# Patient Record
Sex: FEMALE | Race: WHITE | NOT HISPANIC OR LATINO | Employment: PART TIME | URBAN - METROPOLITAN AREA
[De-identification: names, ages, dates, MRNs, and addresses within clinical notes are randomized per-mention and may not be internally consistent; named-entity substitution may affect disease eponyms.]

---

## 2022-07-27 ENCOUNTER — OFFICE VISIT (OUTPATIENT)
Dept: MEDICAL GROUP | Facility: IMAGING CENTER | Age: 22
End: 2022-07-27
Payer: COMMERCIAL

## 2022-07-27 VITALS
OXYGEN SATURATION: 98 % | TEMPERATURE: 98.1 F | WEIGHT: 136.4 LBS | SYSTOLIC BLOOD PRESSURE: 110 MMHG | HEIGHT: 60 IN | RESPIRATION RATE: 17 BRPM | HEART RATE: 103 BPM | BODY MASS INDEX: 26.78 KG/M2 | DIASTOLIC BLOOD PRESSURE: 62 MMHG

## 2022-07-27 DIAGNOSIS — Z76.89 ENCOUNTER TO ESTABLISH CARE: ICD-10-CM

## 2022-07-27 DIAGNOSIS — Z3A.01 LESS THAN 8 WEEKS GESTATION OF PREGNANCY: ICD-10-CM

## 2022-07-27 DIAGNOSIS — Z11.3 SCREENING EXAMINATION FOR SEXUALLY TRANSMITTED DISEASE: ICD-10-CM

## 2022-07-27 DIAGNOSIS — E03.8 OTHER SPECIFIED HYPOTHYROIDISM: ICD-10-CM

## 2022-07-27 DIAGNOSIS — G40.909 NONINTRACTABLE EPILEPSY WITHOUT STATUS EPILEPTICUS, UNSPECIFIED EPILEPSY TYPE (HCC): ICD-10-CM

## 2022-07-27 PROCEDURE — 99204 OFFICE O/P NEW MOD 45 MIN: CPT

## 2022-07-27 RX ORDER — LEVOTHYROXINE SODIUM 112 UG/1
112 TABLET ORAL
Qty: 30 TABLET | Refills: 0 | Status: SHIPPED | OUTPATIENT
Start: 2022-07-27 | End: 2022-08-25

## 2022-07-27 RX ORDER — LEVOTHYROXINE SODIUM 112 UG/1
112 TABLET ORAL
COMMUNITY
End: 2022-07-27 | Stop reason: SDUPTHER

## 2022-07-27 ASSESSMENT — PAIN SCALES - GENERAL: PAINLEVEL: NO PAIN

## 2022-07-27 NOTE — PROGRESS NOTES
Chief Complaint   Patient presents with   • Establish Care   • Requesting Labs     HISTORY OF THE PRESENT ILLNESS: Patient is a 22 y.o. female. This pleasant patient is here today to establish care and to discuss:    To establish care  Previous PCP from [place], [place]  Recently moved here 9 days ago    Hypothyroid  Established condition. Takes levothyroxine 112 mcg every am. Last TSH level 3 months ago, records not available.   Patient denies weakness, fatigue, lethargy, cold/heat intolerance, difficulty losing weight, confusion, depression, dry skin/hair, changes in menses, constipation.    Pregnant  Patient reports of taking pregnancy test a few days ago with positive results.  LMP 6/22/22. Patient reports that this is an expected pregnancy. She is engaged. Patient has been taking prenatal for the past 3-4 months. Patient has had one episode of nausea and vomiting. Her appetite is good. She is eating well-balanced meal. Patient requesting midwife.  No vaginal bleeding, abdominal pain, urinary symptoms, fevers, chills, lethargy, or malaise.    Epilepsy  Established issue. Patient was seeing neurologist in [place]. Patient has stopped taking Vimpat 2 months ago as she does not like how she feels while on medication. Patient states she has not had any episodes since.   Patient states stress triggers her symptoms. Patient is currently managing her stress level.     Allergies: Penicillins    Current Outpatient Medications Ordered in Epic   Medication Sig Dispense Refill   • levothyroxine (SYNTHROID) 112 MCG Tab Take 1 Tablet by mouth every morning on an empty stomach. 30 Tablet 0     No current Epic-ordered facility-administered medications on file.       Past Medical History:   Diagnosis Date   • Epilepsy (HCC)    • Other specified hypothyroidism        Past Surgical History:   Procedure Laterality Date   • MENISCUS REPAIR Right        Social History     Tobacco Use   • Smoking status: Never Smoker   • Smokeless tobacco:  Never Used   Vaping Use   • Vaping Use: Never used   Substance Use Topics   • Alcohol use: Never   • Drug use: Never       Social History     Social History Narrative   • Not on file       History reviewed. No pertinent family history.    ROS: per hpi    Gen: no fevers/chills  Pulm: no sob, no cough  CV: no chest pain, no palpitations, no edema  GI: no nausea/vomiting, no diarrhea  Skin: no rash    Exam: /62 (BP Location: Left arm, Patient Position: Sitting, BP Cuff Size: Adult)   Pulse (!) 103   Temp 36.7 °C (98.1 °F) (Temporal)   Resp 17   Ht 1.524 m (5')   Wt 61.9 kg (136 lb 6.4 oz)   SpO2 98%  Body mass index is 26.64 kg/m².    General: Normal appearing. No distress.  HEENT: Normocephalic. Eyes conjunctiva clear lids without ptosis, ears normal shape and contour,nasal mucosa benign, oropharynx is without erythema, edema or exudates.   Neck: Supple. Thyroid is not enlarged.  Pulmonary: Clear to ausculation.  Normal effort. No rales, ronchi, or wheezing.  Cardiovascular: Regular rate and rhythm without murmur. Carotid and radial pulses are intact and equal bilaterally.  Abdomen: Soft, nontender, nondistended. Normal bowel sounds.  Neurologic: Grossly nonfocal  Lymph: No cervical, supraclavicular lymph nodes are palpable  Skin: Warm and dry.  No obvious lesions.  Musculoskeletal: Normal gait. No extremity cyanosis, clubbing, or edema.  Psych: Normal mood and affect. Alert and oriented x3. Judgment and insight is normal.    Please note that this dictation was created using voice recognition software. I have made every reasonable attempt to correct obvious errors, but I expect that there are errors of grammar and possibly content that I did not discover before finalizing the note.      Assessment/Plan    22 y.o. female with the following -    1. Encounter to establish care    2. Other specified hypothyroidism  Established condition.  TSH level unavailable.  Will refill levothyroxine.    Instructed patient  to take medication first thing in the morning on an empty stomach with at least 8 oz of water, 30 minutes to 1 hour before breakfast. Do not take any other medications with this. Do not take with any other medications.  Will have patient check TSH level asap and make adjustments accordingly.   Advised to seek medical attention for symptoms such as heart palpitations, chest pain, hot flashes, diaphoresis, tremors, weakness, insomnia, anxiety, appetite changes, nausea, vomiting, or diarrhea.    - TSH WITH REFLEX TO FT4; Future  - levothyroxine (SYNTHROID) 112 MCG Tab; Take 1 Tablet by mouth every morning on an empty stomach.  Dispense: 30 Tablet; Refill: 0    3. Screening examination for sexually transmitted disease    - HIV AG/AB COMBO ASSAY SCREENING; Future  - Chlamydia/GC, PCR (Urine); Future  - T.PALLIDUM AB EIA; Future    4. Less than 8 weeks gestation of pregnancy  New finding. Expected pregnancy, no s/s of complication at this time. Will order labs to confirm HcG level. Will order routine lab for prenatal care to rule out conditions that could complicate pregnancy such as diabetes, preeclampsia, infection, STI. Patient requesting midwife for pregnancy care and delivery.  Patient provided with education information handout on pregnancy and recommended vaccination.   Recommend healthy lifestyle such as diet that is rich in vegetables, fruits, fiber-rich whole grains, lean meats, poultry and fish, low in saturated and trans fats, cholesterol, sodium, and added sugars (DASH and Mediterranean diet).   Regular exercise. Smoking cessation. Avoid alcohol consumption. Stress-reduction techniques such as meditation.   Practice good sleep hygiene.   ED symptoms discussed.    - CBC WITH DIFFERENTIAL; Future  - Comp Metabolic Panel; Future  - Lipid Profile; Future  - HEMOGLOBIN A1C; Future  - HCG QUANTITATIVE; Future  - Referral to OB/Gyn  - URINALYSIS,CULTURE IF INDICATED; Future  - HEPATITIS PANEL ACUTE(4 COMPONENTS);  Future    5. Nonintractable epilepsy without status epilepticus, unspecified epilepsy type (HCC)  Established condition and currently controlled without recent episodes. Patient not on any medication at this time. Patient making lifestyle changes to prevent episodes. Will place referral to neurology for evaluating and management during pregnancy.     - Referral to Neurology    Referral for genetic research was offered. Patient decline.    Medical Decision Making/Course:  In the course of preparing for this visit with review of the pertinent past medical history, recent and past clinic visits, current medications, and performing chart, immunization, medical history and medication reconciliation, and in the further course of obtaining the current history pertinent to the clinic visit today, performing an exam and evaluation, ordering and independently evaluating labs, tests, and/or procedures, prescribing any recommended new medications as noted above, providing any pertinent counseling and education and recommending further coordination of care. This was discussed with patient in a shared-decision making conversation, and they understand and agreed with plan of care.    Return if symptoms worsen or fail to improve.     Thank you, Rashmi JJ  [facility]    Please note that this dictation was created using voice recognition software. I have made every reasonable attempt to correct obvious errors, but I expect that there are errors of grammar and possibly content that I did not discover before finalizing the note.

## 2022-07-28 ENCOUNTER — HOSPITAL ENCOUNTER (OUTPATIENT)
Dept: LAB | Facility: MEDICAL CENTER | Age: 22
End: 2022-07-28
Payer: COMMERCIAL

## 2022-07-28 DIAGNOSIS — E03.8 OTHER SPECIFIED HYPOTHYROIDISM: ICD-10-CM

## 2022-07-28 DIAGNOSIS — Z3A.01 LESS THAN 8 WEEKS GESTATION OF PREGNANCY: ICD-10-CM

## 2022-07-28 DIAGNOSIS — Z11.3 SCREENING EXAMINATION FOR SEXUALLY TRANSMITTED DISEASE: ICD-10-CM

## 2022-07-28 LAB
ALBUMIN SERPL BCP-MCNC: 4.5 G/DL (ref 3.2–4.9)
ALBUMIN/GLOB SERPL: 1.9 G/DL
ALP SERPL-CCNC: 79 U/L (ref 30–99)
ALT SERPL-CCNC: 22 U/L (ref 2–50)
ANION GAP SERPL CALC-SCNC: 11 MMOL/L (ref 7–16)
APPEARANCE UR: CLEAR
AST SERPL-CCNC: 22 U/L (ref 12–45)
B-HCG SERPL-ACNC: 2090 MIU/ML (ref 0–5)
BASOPHILS # BLD AUTO: 0.2 % (ref 0–1.8)
BASOPHILS # BLD: 0.02 K/UL (ref 0–0.12)
BILIRUB SERPL-MCNC: 0.4 MG/DL (ref 0.1–1.5)
BILIRUB UR QL STRIP.AUTO: NEGATIVE
BUN SERPL-MCNC: 8 MG/DL (ref 8–22)
CALCIUM SERPL-MCNC: 9.1 MG/DL (ref 8.5–10.5)
CHLORIDE SERPL-SCNC: 101 MMOL/L (ref 96–112)
CHOLEST SERPL-MCNC: 203 MG/DL (ref 100–199)
CO2 SERPL-SCNC: 21 MMOL/L (ref 20–33)
COLOR UR: YELLOW
CREAT SERPL-MCNC: 0.67 MG/DL (ref 0.5–1.4)
EOSINOPHIL # BLD AUTO: 0.12 K/UL (ref 0–0.51)
EOSINOPHIL NFR BLD: 1.3 % (ref 0–6.9)
ERYTHROCYTE [DISTWIDTH] IN BLOOD BY AUTOMATED COUNT: 40.6 FL (ref 35.9–50)
EST. AVERAGE GLUCOSE BLD GHB EST-MCNC: 103 MG/DL
FASTING STATUS PATIENT QL REPORTED: NORMAL
GFR SERPLBLD CREATININE-BSD FMLA CKD-EPI: 125 ML/MIN/1.73 M 2
GLOBULIN SER CALC-MCNC: 2.4 G/DL (ref 1.9–3.5)
GLUCOSE SERPL-MCNC: 85 MG/DL (ref 65–99)
GLUCOSE UR STRIP.AUTO-MCNC: NEGATIVE MG/DL
HAV IGM SERPL QL IA: NORMAL
HBA1C MFR BLD: 5.2 % (ref 4–5.6)
HBV CORE IGM SER QL: NORMAL
HBV SURFACE AG SER QL: NORMAL
HCT VFR BLD AUTO: 42.2 % (ref 37–47)
HCV AB SER QL: NORMAL
HDLC SERPL-MCNC: 49 MG/DL
HGB BLD-MCNC: 14.4 G/DL (ref 12–16)
HIV 1+2 AB+HIV1 P24 AG SERPL QL IA: NORMAL
IMM GRANULOCYTES # BLD AUTO: 0.03 K/UL (ref 0–0.11)
IMM GRANULOCYTES NFR BLD AUTO: 0.3 % (ref 0–0.9)
KETONES UR STRIP.AUTO-MCNC: NEGATIVE MG/DL
LDLC SERPL CALC-MCNC: 140 MG/DL
LEUKOCYTE ESTERASE UR QL STRIP.AUTO: NEGATIVE
LYMPHOCYTES # BLD AUTO: 2.52 K/UL (ref 1–4.8)
LYMPHOCYTES NFR BLD: 26.8 % (ref 22–41)
MCH RBC QN AUTO: 29.5 PG (ref 27–33)
MCHC RBC AUTO-ENTMCNC: 34.1 G/DL (ref 33.6–35)
MCV RBC AUTO: 86.5 FL (ref 81.4–97.8)
MICRO URNS: NORMAL
MONOCYTES # BLD AUTO: 0.59 K/UL (ref 0–0.85)
MONOCYTES NFR BLD AUTO: 6.3 % (ref 0–13.4)
NEUTROPHILS # BLD AUTO: 6.14 K/UL (ref 2–7.15)
NEUTROPHILS NFR BLD: 65.1 % (ref 44–72)
NITRITE UR QL STRIP.AUTO: NEGATIVE
NRBC # BLD AUTO: 0 K/UL
NRBC BLD-RTO: 0 /100 WBC
PH UR STRIP.AUTO: 6.5 [PH] (ref 5–8)
PLATELET # BLD AUTO: 326 K/UL (ref 164–446)
PMV BLD AUTO: 10.5 FL (ref 9–12.9)
POTASSIUM SERPL-SCNC: 3.9 MMOL/L (ref 3.6–5.5)
PROT SERPL-MCNC: 6.9 G/DL (ref 6–8.2)
PROT UR QL STRIP: NEGATIVE MG/DL
RBC # BLD AUTO: 4.88 M/UL (ref 4.2–5.4)
RBC UR QL AUTO: NEGATIVE
SODIUM SERPL-SCNC: 133 MMOL/L (ref 135–145)
SP GR UR STRIP.AUTO: 1.01
T4 FREE SERPL-MCNC: 1.48 NG/DL (ref 0.93–1.7)
TRIGL SERPL-MCNC: 68 MG/DL (ref 0–149)
TSH SERPL DL<=0.005 MIU/L-ACNC: 0.3 UIU/ML (ref 0.38–5.33)
UROBILINOGEN UR STRIP.AUTO-MCNC: 0.2 MG/DL
WBC # BLD AUTO: 9.4 K/UL (ref 4.8–10.8)

## 2022-07-28 PROCEDURE — 84702 CHORIONIC GONADOTROPIN TEST: CPT

## 2022-07-28 PROCEDURE — 80061 LIPID PANEL: CPT

## 2022-07-28 PROCEDURE — 81003 URINALYSIS AUTO W/O SCOPE: CPT

## 2022-07-28 PROCEDURE — 85025 COMPLETE CBC W/AUTO DIFF WBC: CPT

## 2022-07-28 PROCEDURE — 84443 ASSAY THYROID STIM HORMONE: CPT

## 2022-07-28 PROCEDURE — 84439 ASSAY OF FREE THYROXINE: CPT

## 2022-07-28 PROCEDURE — 86780 TREPONEMA PALLIDUM: CPT

## 2022-07-28 PROCEDURE — 87491 CHLMYD TRACH DNA AMP PROBE: CPT

## 2022-07-28 PROCEDURE — 83036 HEMOGLOBIN GLYCOSYLATED A1C: CPT

## 2022-07-28 PROCEDURE — 87591 N.GONORRHOEAE DNA AMP PROB: CPT

## 2022-07-28 PROCEDURE — 80053 COMPREHEN METABOLIC PANEL: CPT

## 2022-07-28 PROCEDURE — 36415 COLL VENOUS BLD VENIPUNCTURE: CPT

## 2022-07-28 PROCEDURE — 87389 HIV-1 AG W/HIV-1&-2 AB AG IA: CPT

## 2022-07-28 PROCEDURE — 80074 ACUTE HEPATITIS PANEL: CPT

## 2022-07-29 LAB — T PALLIDUM AB SER QL IA: NORMAL

## 2022-07-30 LAB
C TRACH DNA SPEC QL NAA+PROBE: NEGATIVE
N GONORRHOEA DNA SPEC QL NAA+PROBE: NEGATIVE
SPECIMEN SOURCE: NORMAL

## 2022-08-08 ENCOUNTER — OFFICE VISIT (OUTPATIENT)
Dept: MEDICAL GROUP | Facility: IMAGING CENTER | Age: 22
End: 2022-08-08
Payer: COMMERCIAL

## 2022-08-08 ENCOUNTER — HOSPITAL ENCOUNTER (OUTPATIENT)
Dept: LAB | Facility: MEDICAL CENTER | Age: 22
End: 2022-08-08
Payer: COMMERCIAL

## 2022-08-08 ENCOUNTER — TELEPHONE (OUTPATIENT)
Dept: MEDICAL GROUP | Facility: IMAGING CENTER | Age: 22
End: 2022-08-08

## 2022-08-08 VITALS
WEIGHT: 136.6 LBS | HEIGHT: 60 IN | DIASTOLIC BLOOD PRESSURE: 68 MMHG | TEMPERATURE: 99.2 F | OXYGEN SATURATION: 97 % | BODY MASS INDEX: 26.82 KG/M2 | HEART RATE: 89 BPM | SYSTOLIC BLOOD PRESSURE: 114 MMHG

## 2022-08-08 DIAGNOSIS — E03.8 OTHER SPECIFIED HYPOTHYROIDISM: ICD-10-CM

## 2022-08-08 LAB
T4 FREE SERPL-MCNC: 1.34 NG/DL (ref 0.93–1.7)
TSH SERPL DL<=0.005 MIU/L-ACNC: 0.3 UIU/ML (ref 0.38–5.33)

## 2022-08-08 PROCEDURE — 84439 ASSAY OF FREE THYROXINE: CPT

## 2022-08-08 PROCEDURE — 36415 COLL VENOUS BLD VENIPUNCTURE: CPT

## 2022-08-08 PROCEDURE — 84443 ASSAY THYROID STIM HORMONE: CPT

## 2022-08-08 PROCEDURE — 99212 OFFICE O/P EST SF 10 MIN: CPT

## 2022-08-08 ASSESSMENT — FIBROSIS 4 INDEX: FIB4 SCORE: 0.35

## 2022-08-08 ASSESSMENT — PAIN SCALES - GENERAL: PAINLEVEL: NO PAIN

## 2022-08-08 ASSESSMENT — PATIENT HEALTH QUESTIONNAIRE - PHQ9: CLINICAL INTERPRETATION OF PHQ2 SCORE: 0

## 2022-08-08 NOTE — PROGRESS NOTES
Subjective:     CC:   Chief Complaint   Patient presents with   • Thyroid Follow-up     Requesting labs for thyroid        HPI:   Nasima presents today to discuss:    Patient requesting to re-draw her thyroid levels as she is leaving for [facility] and will not be back for a couple of weeks. Patient is currently taking 112 mcg daily. Patient reports of having symptoms of increasing fatigue, weight gain of 4 lbs within the past 6 weeks, and increasing appetite. Patient is <8 weeks gestation of pregnancy. Patient denies nausea, vomiting, abdominal pain, vaginal bleeding or discharge. Patient has not established with OB/GYN.      Latest Reference Range & Units 07/28/22 09:29   TSH 0.380 - 5.330 uIU/mL 0.300 (L)   Free T-4 0.93 - 1.70 ng/dL 1.48     Pregnant Females, 1st Trimester  0.050-3.700   Pregnant Females, 2nd Trimester  0.310-4.350   Pregnant Females, 3rd Trimester  0.410-5.180       Past Medical History:   Diagnosis Date   • Epilepsy (HCC)    • Other specified hypothyroidism      No family history on file.  Past Surgical History:   Procedure Laterality Date   • MENISCUS REPAIR Right      Social History     Tobacco Use   • Smoking status: Never Smoker   • Smokeless tobacco: Never Used   Vaping Use   • Vaping Use: Never used   Substance Use Topics   • Alcohol use: Never   • Drug use: Never     Social History     Social History Narrative   • Not on file     Current Outpatient Medications Ordered in Epic   Medication Sig Dispense Refill   • levothyroxine (SYNTHROID) 112 MCG Tab Take 1 Tablet by mouth every morning on an empty stomach. 30 Tablet 0     No current Epic-ordered facility-administered medications on file.     Flu virus vaccine and Penicillins    ROS: see hpi  Gen: no fevers/chills  Pulm: no sob, no cough  CV: no chest pain, no palpitations, no edema  GI: no nausea/vomiting, no diarrhea  Skin: no rash    Objective:   Exam:  /68 (BP Location: Left arm, Patient Position: Sitting, BP Cuff Size:  Small adult)   Pulse 89   Temp 37.3 °C (99.2 °F) (Temporal)   Ht 1.524 m (5')   Wt 62 kg (136 lb 9.6 oz)   SpO2 97%   BMI 26.68 kg/m²    Body mass index is 26.68 kg/m².    Gen: Alert and oriented, No apparent distress.  HEENT: Head atraumatic, normocephalic. Pupils equal and round.  Neck: Neck is supple without lymphadenopathy.   Lungs: Normal effort, CTA bilaterally, no wheezes, rhonchi, or rales  CV: Regular rate and rhythm. No murmurs, rubs, or gallops.  ABD: +BS. Non-tender, non-distended. No rebound, rigidity, or guarding.  Ext: No clubbing, cyanosis, edema.    Assessment & Plan:     22 y.o. female with the following -     1. Other specified hypothyroidism  Chronic condition. Patient's most recent TSH within therapeutic range of first trimester of pregnancy. Recommend patient to continue with current dose. Will repeat TSH in 4 to 6  weeks. Patient reassured and discussed in detail of her symptoms as normal process of pregnancy. Encouraged patient to get plenty of rest, increase fluid, healthy diet, regular exercise, stress reduction techniques, and good sleep hygiene. Strongly advised patient to establish with OB/GYN.     - TSH WITH REFLEX TO FT4; Future  Medical Decision Making/Course:  In the course of preparing for this visit with review of the pertinent past medical history, recent and past clinic visits, current medications, and performing chart, immunization, medical history and medication reconciliation, and in the further course of obtaining the current history pertinent to the clinic visit today, performing an exam and evaluation, ordering and independently evaluating labs, tests, and/or procedures, prescribing any recommended new medications as noted above, providing any pertinent counseling and education and recommending further coordination of care. This was discussed with patient in a shared-decision making conversation, and they understand and agreed with plan of care.      Return if  symptoms worsen or fail to improve.    DEX Hdz   [facility]    Please note that this dictation was created using voice recognition software. I have made every reasonable attempt to correct obvious errors, but I expect that there are errors of grammar and possibly content that I did not discover before finalizing the note.

## 2022-08-25 DIAGNOSIS — E03.8 OTHER SPECIFIED HYPOTHYROIDISM: ICD-10-CM

## 2022-08-25 RX ORDER — LEVOTHYROXINE SODIUM 112 UG/1
TABLET ORAL
Qty: 30 TABLET | Refills: 0 | Status: SHIPPED | OUTPATIENT
Start: 2022-08-25 | End: 2022-09-22

## 2022-08-25 NOTE — TELEPHONE ENCOUNTER
Received request via: Pharmacy    Was the patient seen in the last year in this department? Yes    Does the patient have an active prescription (recently filled or refills available) for medication(s) requested? No e

## 2022-09-07 ENCOUNTER — TELEPHONE (OUTPATIENT)
Dept: MEDICAL GROUP | Facility: IMAGING CENTER | Age: 22
End: 2022-09-07
Payer: OTHER GOVERNMENT

## 2022-09-07 NOTE — TELEPHONE ENCOUNTER
Caller Name: Nasima Nelson    Call Back Number: [phone] (home)       How would the patient prefer to be contacted with a response: Phone call OK to leave a detailed message    Pt called to request lab orders to check her thyroid.   Rashmi can we order her some lab work?

## 2022-09-22 DIAGNOSIS — E03.8 OTHER SPECIFIED HYPOTHYROIDISM: ICD-10-CM

## 2022-09-22 RX ORDER — LEVOTHYROXINE SODIUM 112 UG/1
TABLET ORAL
Qty: 30 TABLET | Refills: 0 | Status: SHIPPED | OUTPATIENT
Start: 2022-09-22 | End: 2022-10-19

## 2022-09-26 ENCOUNTER — APPOINTMENT (OUTPATIENT)
Dept: RADIOLOGY | Facility: MEDICAL CENTER | Age: 22
End: 2022-09-26
Attending: EMERGENCY MEDICINE
Payer: OTHER GOVERNMENT

## 2022-09-26 ENCOUNTER — HOSPITAL ENCOUNTER (EMERGENCY)
Facility: MEDICAL CENTER | Age: 22
End: 2022-09-26
Attending: EMERGENCY MEDICINE
Payer: OTHER GOVERNMENT

## 2022-09-26 VITALS
RESPIRATION RATE: 20 BRPM | BODY MASS INDEX: 27.35 KG/M2 | TEMPERATURE: 97.8 F | HEART RATE: 88 BPM | HEIGHT: 60 IN | WEIGHT: 139.33 LBS | DIASTOLIC BLOOD PRESSURE: 71 MMHG | OXYGEN SATURATION: 96 % | SYSTOLIC BLOOD PRESSURE: 134 MMHG

## 2022-09-26 DIAGNOSIS — Z3A.13 13 WEEKS GESTATION OF PREGNANCY: ICD-10-CM

## 2022-09-26 DIAGNOSIS — R56.9 SEIZURE (HCC): ICD-10-CM

## 2022-09-26 LAB
ALBUMIN SERPL BCP-MCNC: 4 G/DL (ref 3.2–4.9)
ALBUMIN/GLOB SERPL: 1.7 G/DL
ALP SERPL-CCNC: 67 U/L (ref 30–99)
ALT SERPL-CCNC: 14 U/L (ref 2–50)
ANION GAP SERPL CALC-SCNC: 10 MMOL/L (ref 7–16)
APPEARANCE UR: CLEAR
AST SERPL-CCNC: 18 U/L (ref 12–45)
B-HCG SERPL-ACNC: ABNORMAL MIU/ML (ref 0–5)
BASOPHILS # BLD AUTO: 0.2 % (ref 0–1.8)
BASOPHILS # BLD: 0.02 K/UL (ref 0–0.12)
BILIRUB SERPL-MCNC: 0.2 MG/DL (ref 0.1–1.5)
BILIRUB UR QL STRIP.AUTO: NEGATIVE
BUN SERPL-MCNC: 6 MG/DL (ref 8–22)
CALCIUM SERPL-MCNC: 8.8 MG/DL (ref 8.5–10.5)
CHLORIDE SERPL-SCNC: 105 MMOL/L (ref 96–112)
CO2 SERPL-SCNC: 22 MMOL/L (ref 20–33)
COLOR UR: YELLOW
CREAT SERPL-MCNC: 0.56 MG/DL (ref 0.5–1.4)
EOSINOPHIL # BLD AUTO: 0.17 K/UL (ref 0–0.51)
EOSINOPHIL NFR BLD: 1.8 % (ref 0–6.9)
ERYTHROCYTE [DISTWIDTH] IN BLOOD BY AUTOMATED COUNT: 40.7 FL (ref 35.9–50)
GFR SERPLBLD CREATININE-BSD FMLA CKD-EPI: 130 ML/MIN/1.73 M 2
GLOBULIN SER CALC-MCNC: 2.4 G/DL (ref 1.9–3.5)
GLUCOSE SERPL-MCNC: 91 MG/DL (ref 65–99)
GLUCOSE UR STRIP.AUTO-MCNC: NEGATIVE MG/DL
HCT VFR BLD AUTO: 41.5 % (ref 37–47)
HGB BLD-MCNC: 14.3 G/DL (ref 12–16)
IMM GRANULOCYTES # BLD AUTO: 0.03 K/UL (ref 0–0.11)
IMM GRANULOCYTES NFR BLD AUTO: 0.3 % (ref 0–0.9)
KETONES UR STRIP.AUTO-MCNC: NEGATIVE MG/DL
LEUKOCYTE ESTERASE UR QL STRIP.AUTO: NEGATIVE
LIPASE SERPL-CCNC: 33 U/L (ref 11–82)
LYMPHOCYTES # BLD AUTO: 3.61 K/UL (ref 1–4.8)
LYMPHOCYTES NFR BLD: 38.2 % (ref 22–41)
MCH RBC QN AUTO: 29.9 PG (ref 27–33)
MCHC RBC AUTO-ENTMCNC: 34.5 G/DL (ref 33.6–35)
MCV RBC AUTO: 86.8 FL (ref 81.4–97.8)
MICRO URNS: NORMAL
MONOCYTES # BLD AUTO: 0.67 K/UL (ref 0–0.85)
MONOCYTES NFR BLD AUTO: 7.1 % (ref 0–13.4)
NEUTROPHILS # BLD AUTO: 4.95 K/UL (ref 2–7.15)
NEUTROPHILS NFR BLD: 52.4 % (ref 44–72)
NITRITE UR QL STRIP.AUTO: NEGATIVE
NRBC # BLD AUTO: 0 K/UL
NRBC BLD-RTO: 0 /100 WBC
NUMBER OF RH DOSES IND 8505RD: NORMAL
PH UR STRIP.AUTO: 7 [PH] (ref 5–8)
PLATELET # BLD AUTO: 243 K/UL (ref 164–446)
PMV BLD AUTO: 10.6 FL (ref 9–12.9)
POTASSIUM SERPL-SCNC: 3.8 MMOL/L (ref 3.6–5.5)
PROT SERPL-MCNC: 6.4 G/DL (ref 6–8.2)
PROT UR QL STRIP: NEGATIVE MG/DL
RBC # BLD AUTO: 4.78 M/UL (ref 4.2–5.4)
RBC UR QL AUTO: NEGATIVE
RH BLD: NORMAL
SODIUM SERPL-SCNC: 137 MMOL/L (ref 135–145)
SP GR UR STRIP.AUTO: 1.01
UROBILINOGEN UR STRIP.AUTO-MCNC: 0.2 MG/DL
WBC # BLD AUTO: 9.5 K/UL (ref 4.8–10.8)

## 2022-09-26 PROCEDURE — 86901 BLOOD TYPING SEROLOGIC RH(D): CPT

## 2022-09-26 PROCEDURE — 84702 CHORIONIC GONADOTROPIN TEST: CPT

## 2022-09-26 PROCEDURE — 76815 OB US LIMITED FETUS(S): CPT

## 2022-09-26 PROCEDURE — 85025 COMPLETE CBC W/AUTO DIFF WBC: CPT

## 2022-09-26 PROCEDURE — 83690 ASSAY OF LIPASE: CPT

## 2022-09-26 PROCEDURE — 99284 EMERGENCY DEPT VISIT MOD MDM: CPT

## 2022-09-26 PROCEDURE — 80053 COMPREHEN METABOLIC PANEL: CPT

## 2022-09-26 PROCEDURE — 81003 URINALYSIS AUTO W/O SCOPE: CPT

## 2022-09-26 PROCEDURE — 36415 COLL VENOUS BLD VENIPUNCTURE: CPT

## 2022-09-26 RX ORDER — LEVETIRACETAM 500 MG/1
500 TABLET ORAL 2 TIMES DAILY
Qty: 120 EACH | Refills: 0 | Status: SHIPPED | OUTPATIENT
Start: 2022-09-26

## 2022-09-26 RX ORDER — FERROUS SULFATE 325(65) MG
325 TABLET ORAL DAILY
COMMUNITY

## 2022-09-26 ASSESSMENT — FIBROSIS 4 INDEX: FIB4 SCORE: 0.35

## 2022-09-26 NOTE — ED TRIAGE NOTES
Chief Complaint   Patient presents with    Abdominal Pain     Started around 2300 tonight, to the bilateral lower abdomen, dull pain, denies vaginal bleeding     Pregnancy     13-14 weeks per patient, has not seen an OB/Gyn yet    Seizure     Has not been taking seizure medication d/t pregnancy.  States has had two tonight, witnessed by significant other, did not hit her head.       Pt ambulated to triage. Pt A&Ox4, came in for above complaints.     Pt to lobby . Pt educated on alerting staff in changes to condition. Pt verbalized understanding. Protocol ordered.     /81   Pulse 89   Temp 36.6 °C (97.8 °F) (Temporal)   Resp 18   Ht 1.524 m (5')   Wt 63.2 kg (139 lb 5.3 oz)   SpO2 99%   BMI 27.21 kg/m²

## 2022-09-26 NOTE — ED PROVIDER NOTES
ED Provider Note    CHIEF COMPLAINT  Chief Complaint   Patient presents with    Abdominal Pain     Started around 2300 tonight, to the bilateral lower abdomen, dull pain, denies vaginal bleeding     Pregnancy     13-14 weeks per patient, has not seen an OB/Gyn yet    Seizure     Has not been taking seizure medication d/t pregnancy.  States has had two tonight, witnessed by significant other, did not hit her head.       HPI  Nasima Nelson is a 22 y.o. female who presents after having a seizure.  Patient has a history of seizure disorder.  She has been off of her seizure medications for about the last 5 months.  She was previously on Vimpat but wanted to get pregnant and so after discussion [place] neurologist [place] it was elected to hold medications and see how things go.  She moved to [place].  Does not have a local neurologist.  She got pregnant and is about 13 to 14 weeks by her report.  G1, P0 LMP 6/22.  Has not seen OB/GYN yet.  Everything has been going okay.  She did have a short-lived seizure about a month ago.  Tonight she had another seizure at about 10 PM.  This was witnessed by her new .  It lasted about 45 seconds.  Generalized tonic-clonic with a postictal period.  Denies any injury.  After the seizure she had abdominal pain.  She localizes this in the suprapubic region.  Neither worse on the right or the left.  Dull aching pain.  No modifying factors and constant.  She has not had any bleeding.  No dysuria.  Denies fevers chills.  Denies chest pain shortness of breath or headache.    REVIEW OF SYSTEMS  As per HPI, otherwise a 10 point review of systems is negative    PAST MEDICAL HISTORY  Past Medical History:   Diagnosis Date    Epilepsy (HCC)     Other specified hypothyroidism        SOCIAL HISTORY  Social History     Tobacco Use    Smoking status: Never    Smokeless tobacco: Never   Vaping Use    Vaping Use: Never used   Substance Use Topics    Alcohol use: Never    Drug use:  Never       SURGICAL HISTORY  Past Surgical History:   Procedure Laterality Date    MENISCUS REPAIR Right        CURRENT MEDICATIONS  Home Medications       Reviewed by Natalie Eagle R.N. (Registered Nurse) on 09/26/22 at 0429  Med List Status: Partial     Medication Last Dose Status   ferrous sulfate 325 (65 Fe) MG tablet  Active   levothyroxine (SYNTHROID) 112 MCG Tab  Active   Prenatal MV-Min-Fe Fum-FA-DHA (PRENATAL 1 PO)  Active                    ALLERGIES  Allergies   Allergen Reactions    Flu Virus Vaccine Hives    Penicillins        PHYSICAL EXAM  VITAL SIGNS: /81   Pulse 89   Temp 36.6 °C (97.8 °F) (Temporal)   Resp 18   Ht 1.524 m (5')   Wt 63.2 kg (139 lb 5.3 oz)   SpO2 99%   BMI 27.21 kg/m²    Constitutional: Awake and alert  HENT: Normal inspection  Eyes: Normal inspection  Neck: Grossly normal range of motion.  Cardiovascular: Normal heart rate, Normal rhythm.  Symmetric peripheral pulses.   Thorax & Lungs: No respiratory distress, No wheezing, No rales, No rhonchi, No chest tenderness.   Abdomen: Bowel sounds normal, soft, non-distended, nontender, no mass  Skin: No obvious rash.  Back: No tenderness, No CVA tenderness.   Extremities: No clubbing, cyanosis, edema, no Homans or cords.  Neurologic: Grossly normal   Psychiatric: Normal for situation    RADIOLOGY/PROCEDURES  US-OB LIMITED TRANSABDOMINAL   Final Result         1.  Single intrauterine pregnancy of an estimated gestational age of 13 weeks 4 days with an estimated date of delivery of March 30, 2023.   2.  Small subchorionic hemorrhage           Imaging is interpreted by radiologist    Labs:  Results for orders placed or performed during the hospital encounter of 09/26/22   CBC WITH DIFFERENTIAL   Result Value Ref Range    WBC 9.5 4.8 - 10.8 K/uL    RBC 4.78 4.20 - 5.40 M/uL    Hemoglobin 14.3 12.0 - 16.0 g/dL    Hematocrit 41.5 37.0 - 47.0 %    MCV 86.8 81.4 - 97.8 fL    MCH 29.9 27.0 - 33.0 pg    MCHC 34.5 33.6 - 35.0 g/dL     RDW 40.7 35.9 - 50.0 fL    Platelet Count 243 164 - 446 K/uL    MPV 10.6 9.0 - 12.9 fL    Neutrophils-Polys 52.40 44.00 - 72.00 %    Lymphocytes 38.20 22.00 - 41.00 %    Monocytes 7.10 0.00 - 13.40 %    Eosinophils 1.80 0.00 - 6.90 %    Basophils 0.20 0.00 - 1.80 %    Immature Granulocytes 0.30 0.00 - 0.90 %    Nucleated RBC 0.00 /100 WBC    Neutrophils (Absolute) 4.95 2.00 - 7.15 K/uL    Lymphs (Absolute) 3.61 1.00 - 4.80 K/uL    Monos (Absolute) 0.67 0.00 - 0.85 K/uL    Eos (Absolute) 0.17 0.00 - 0.51 K/uL    Baso (Absolute) 0.02 0.00 - 0.12 K/uL    Immature Granulocytes (abs) 0.03 0.00 - 0.11 K/uL    NRBC (Absolute) 0.00 K/uL   COMP METABOLIC PANEL   Result Value Ref Range    Sodium 137 135 - 145 mmol/L    Potassium 3.8 3.6 - 5.5 mmol/L    Chloride 105 96 - 112 mmol/L    Co2 22 20 - 33 mmol/L    Anion Gap 10.0 7.0 - 16.0    Glucose 91 65 - 99 mg/dL    Bun 6 (L) 8 - 22 mg/dL    Creatinine 0.56 0.50 - 1.40 mg/dL    Calcium 8.8 8.5 - 10.5 mg/dL    AST(SGOT) 18 12 - 45 U/L    ALT(SGPT) 14 2 - 50 U/L    Alkaline Phosphatase 67 30 - 99 U/L    Total Bilirubin 0.2 0.1 - 1.5 mg/dL    Albumin 4.0 3.2 - 4.9 g/dL    Total Protein 6.4 6.0 - 8.2 g/dL    Globulin 2.4 1.9 - 3.5 g/dL    A-G Ratio 1.7 g/dL   LIPASE   Result Value Ref Range    Lipase 33 11 - 82 U/L   HCG QUANTITATIVE   Result Value Ref Range    Bhcg 75110.0 (H) 0.0 - 5.0 mIU/mL   URINALYSIS,CULTURE IF INDICATED    Specimen: Urine, Clean Catch; Blood   Result Value Ref Range    Color Yellow     Character Clear     Specific Gravity 1.010 <1.035    Ph 7.0 5.0 - 8.0    Glucose Negative Negative mg/dL    Ketones Negative Negative mg/dL    Protein Negative Negative mg/dL    Bilirubin Negative Negative    Urobilinogen, Urine 0.2 Negative    Nitrite Negative Negative    Leukocyte Esterase Negative Negative    Occult Blood Negative Negative    Micro Urine Req see below    ESTIMATED GFR   Result Value Ref Range    GFR (CKD-EPI) 130 >60 mL/min/1.73 m 2   RH TYPE FOR RHOGAM  FROM E.D.   Result Value Ref Range    Emergency Department Rh Typing POS     Number Of Rh Doses Indicated ZERO          COURSE & MEDICAL DECISION MAKING  Patient presents with seizure during second trimester pregnancy.  She is not postictal has no neuro findings.  This is an ongoing problem for her.  She is no longer on antiepileptics and this is likely cause.  She did complain of abdominal pain.  Obtain work-up.  Laboratory data as well as ultrasound were unremarkable.  She does have a small subchorionic hemorrhage that does not appear to present any consequence.  She is Rh+ and has not had any vaginal bleeding.  I spoke with Dr. Way who recommended Keppra.  Right before I went in to tell the patient about the results of her studies she had a short duration generalized tonic-clonic seizure and was postictal.  Patient's  says that the patient had an adverse reaction to Keppra he believes it was mood changes.  Requested that we wait to give any antiepileptics until she can provide history.  Patient was observed in the emergency department.    Admit to ED observation at 6:30 AM 9/26/2022  No known family history    Patient observed in the ED.  No further seizure.  Mental status normalized.  I spoke with the patient about advice from neurologist.  Patient says she does not want IV infusion of Keppra.  She states that she became very anxious or depressed when she was on Keppra.  After some discussion she did state that she would take pills but wanted to start them at home did not want to take anything while in the ED.  She stated she has had conversations with her neurologist in [place] about treatment.  I offered to call her neurologist but she declined and stated she was ready to go home.  She is awake alert oriented.  Appears to be able to make medical decisions.  I was very clear that not taking and after epileptic while she is in the ER is not adequate and she certainly could have another seizure which  could result in injury to herself or her fetus.  She voiced understanding to this but still requested to leave and stated she would take p.o. Keppra at home after she get some sleep.  Her  was witness to this conversation.  I have placed a referral in [facility] to neurology as well as [facility].  I would like her to be seen promptly with neurology and undergo routine prenatal care.  She should return to the ER for recurrent seizure or any concerning symptoms.    FINAL IMPRESSION  1.  Seizure  2.  13-week pregnancy without evidence of complication      This dictation was created using voice recognition software. The accuracy of the dictation is limited to the abilities of the software.  The nursing notes were reviewed and certain aspects of this information were incorporated into this note.      Electronically signed by: Foreign Jackson M.D., 9/26/2022 5:12 AM

## 2022-09-27 ENCOUNTER — TELEPHONE (OUTPATIENT)
Dept: OBGYN | Facility: CLINIC | Age: 22
End: 2022-09-27
Payer: OTHER GOVERNMENT

## 2022-09-27 NOTE — TELEPHONE ENCOUNTER
Called pt to schedule follow up from visit from the ER, pt stated that she is already scheduled elsewhere and won't provide the name of the clinic. I insisted to ask to where she is going because we are obligated to follow up w/ her for her next appt. Pt again denied to provide that information.

## 2022-10-19 DIAGNOSIS — E03.8 OTHER SPECIFIED HYPOTHYROIDISM: ICD-10-CM

## 2022-10-19 RX ORDER — LEVOTHYROXINE SODIUM 112 UG/1
TABLET ORAL
Qty: 30 TABLET | Refills: 0 | Status: SHIPPED | OUTPATIENT
Start: 2022-10-19

## 2022-11-21 DIAGNOSIS — O99.282 HYPOTHYROID IN PREGNANCY, ANTEPARTUM, SECOND TRIMESTER: ICD-10-CM

## 2022-11-21 DIAGNOSIS — E03.9 HYPOTHYROID IN PREGNANCY, ANTEPARTUM, SECOND TRIMESTER: ICD-10-CM

## 2022-11-21 DIAGNOSIS — O99.280 HYPOTHYROID IN PREGNANCY, ANTEPARTUM, UNSPECIFIED TRIMESTER: ICD-10-CM

## 2022-11-21 DIAGNOSIS — E03.9 HYPOTHYROID IN PREGNANCY, ANTEPARTUM, UNSPECIFIED TRIMESTER: ICD-10-CM

## 2023-01-19 ENCOUNTER — HOSPITAL ENCOUNTER (EMERGENCY)
Facility: MEDICAL CENTER | Age: 23
End: 2023-01-19
Attending: EMERGENCY MEDICINE
Payer: COMMERCIAL

## 2023-01-19 VITALS
TEMPERATURE: 97.9 F | DIASTOLIC BLOOD PRESSURE: 71 MMHG | SYSTOLIC BLOOD PRESSURE: 110 MMHG | RESPIRATION RATE: 18 BRPM | WEIGHT: 150 LBS | BODY MASS INDEX: 29.45 KG/M2 | OXYGEN SATURATION: 97 % | HEIGHT: 60 IN | HEART RATE: 86 BPM

## 2023-01-19 DIAGNOSIS — Z3A.31 31 WEEKS GESTATION OF PREGNANCY: ICD-10-CM

## 2023-01-19 DIAGNOSIS — R56.9 SEIZURE (HCC): ICD-10-CM

## 2023-01-19 LAB
APPEARANCE UR: CLEAR
BILIRUB UR QL STRIP.AUTO: NEGATIVE
COLOR UR: YELLOW
GLUCOSE UR STRIP.AUTO-MCNC: NEGATIVE MG/DL
KETONES UR STRIP.AUTO-MCNC: NEGATIVE MG/DL
LEUKOCYTE ESTERASE UR QL STRIP.AUTO: NEGATIVE
MICRO URNS: NORMAL
NITRITE UR QL STRIP.AUTO: NEGATIVE
PH UR STRIP.AUTO: 7 [PH] (ref 5–8)
PROT UR QL STRIP: NEGATIVE MG/DL
RBC UR QL AUTO: NEGATIVE
SP GR UR STRIP.AUTO: 1.01
UROBILINOGEN UR STRIP.AUTO-MCNC: 0.2 MG/DL

## 2023-01-19 PROCEDURE — 81003 URINALYSIS AUTO W/O SCOPE: CPT

## 2023-01-19 PROCEDURE — 99284 EMERGENCY DEPT VISIT MOD MDM: CPT

## 2023-01-19 ASSESSMENT — FIBROSIS 4 INDEX: FIB4 SCORE: 0.48

## 2023-01-19 NOTE — ED TRIAGE NOTES
Chief Complaint   Patient presents with    Seizure     Hx of epilespy. Per ems pt had x 2 seizure episodes that lasted 1-2 minutes each approximately an hour ago. Pt arrives alert and oriented x 4       Pt is 31 weeks pregnant. Pt states she was taken off vimpat by MD when she found out she was pregnant. Hx of recent bacterial vaginosis diagnosis as well.     /83   Pulse 99   Resp 20   Ht 1.524 m (5')   Wt 68 kg (150 lb)   SpO2 95%   BMI 29.29 kg/m²

## 2023-01-19 NOTE — PROGRESS NOTES
Pt is a ; MAIRA of 3/26; making her 30.4. PT was arrived via [facility] post ictal. Pt has a hx of epilepsy and is not currently taking any medications. Pt denies LOF, VB, UCs and reports not feeling much FM today. Pt was at OB office visit when seizure occurred but no reports of abdominal trauma. REactive NST obtained. ER MD aware of OB on call. Pt encouraged to have ER call if status changes.

## 2023-01-19 NOTE — ED PROVIDER NOTES
ED Provider Note    CHIEF COMPLAINT  Chief Complaint   Patient presents with    Seizure     Hx of epilespy. Per ems pt had x 2 seizure episodes that lasted 1-2 minutes each approximately an hour ago. Pt arrives alert and oriented x 4       EXTERNAL RECORDS REVIEWED  Outpatient Notes the patient was seen in the ER on September 26, 2022 with seizure, at that time she was 13 weeks pregnant, the patient at that time had a neurologist in [place] and they had decided that she would stop her Vimpat medication for seizure because she was pregnant.  At that time the patient did not want to take the Keppra and was discharged alert and oriented.    HPI/ROS  LIMITATION TO HISTORY   Select: : None  OUTSIDE HISTORIAN(S):      Nasima Nelson is a 22 y.o. female who presents with past medical history see med for epilepsy, historically she had been on the medication Vimpat but when she became pregnant she stopped taking the medication after discussion with her neurologist in [place].  The patient reports that she does have intermittent seizures throughout her pregnancy.  She reports that she is 31 weeks pregnant in her first pregnancy.  She is followed by Dr. Tonny Smith her OB and Dr. Burkett for neurology now in [place].  The patient had 2 seizures today about 1 hour ago that lasted 1 to 2 minutes each.  The patient now says she feels at her baseline.  She denies any neurological deficits.  She denies headache.  She denies any trauma.  The patient denies knowing of hypertension in pregnancy and denies leg swelling.    PAST MEDICAL HISTORY   has a past medical history of Epilepsy (HCC), Hypothyroidism, and Other specified hypothyroidism.    SURGICAL HISTORY   has a past surgical history that includes meniscus repair (Right).    FAMILY HISTORY  History reviewed. No pertinent family history.    SOCIAL HISTORY  Social History     Tobacco Use    Smoking status: Never    Smokeless tobacco: Never   Vaping Use    Vaping Use: Never used    Substance and Sexual Activity    Alcohol use: Never    Drug use: Never    Sexual activity: Yes     Partners: Male       CURRENT MEDICATIONS  Home Medications       Reviewed by Mitali Vick R.N. (Registered Nurse) on 01/19/23 at 1324  Med List Status: Not Addressed     Medication Last Dose Status   ferrous sulfate 325 (65 Fe) MG tablet  Active   levETIRAcetam (KEPPRA) 500 MG Tab  Active   levothyroxine (SYNTHROID) 112 MCG Tab  Active   Prenatal MV-Min-Fe Fum-FA-DHA (PRENATAL 1 PO)  Active                    ALLERGIES  Allergies   Allergen Reactions    Flagyl [Metronidazole]     Flu Virus Vaccine Hives    Keppra [Levetiracetam]     Lamictal [Lamotrigine]     Penicillins        PHYSICAL EXAM  VITAL SIGNS: /71   Pulse 86   Temp 37 °C (98.6 °F) (Temporal)   Resp 18   Ht 1.524 m (5')   Wt 68 kg (150 lb)   SpO2 97%   BMI 29.29 kg/m²    Constitutional: Alert.  HENT: No signs of trauma, Bilateral external ears normal, Nose normal.   Eyes: Pupils are equal and reactive, Conjunctiva normal, Non-icteric.   Neck: Normal range of motion, No tenderness, Supple, No stridor.   Lymphatic: No lymphadenopathy noted.   Cardiovascular: Regular rate and rhythm, no murmurs.   Thorax & Lungs: Normal breath sounds, No respiratory distress, No wheezing, No chest tenderness.   Abdomen: Gravid, no tenderness.  Skin: Warm, Dry, No erythema, No rash.   Back: No bony tenderness, No CVA tenderness.   Extremities: Intact distal pulses, No edema, No tenderness, No cyanosis.  Musculoskeletal: Good range of motion in all major joints. No tenderness to palpation or major deformities noted.   Neurologic: Alert , Normal motor function, Normal sensory function, No focal deficits noted.   Psychiatric: Affect normal, Judgment normal, Mood normal.       DIAGNOSTIC STUDIES / PROCEDURES    LABS  Labs Reviewed   URINALYSIS     Negative, no protein        COURSE & MEDICAL DECISION MAKING    ED Observation Status? No; Patient does not meet  criteria for ED Observation.     INITIAL ASSESSMENT AND PLAN  Care Narrative: The patient presents with seizure, she is hypertensive initially, differential diagnosis includes epilepsy, eclampsia.  UA was ordered to evaluate for protein in the urine.  On physical exam she does not have edema.  She possibly is hypertensive from her seizure.    L&D nurses were called, they are monitoring the baby as I enter the room.    ADDITIONAL PROBLEM LIST AND DISPOSITION        I have discussed management of the patient with the following physicians and GALLITO's: Dr. Smith the patient's obstetrician    Discussion of management with other \[facility] [place] [facility]\"" or appropriate source(s): Labor and delivery came down to monitor the patient's baby.    Escalation of care considered, and ultimately not performed:acute inpatient care management, however at this time, the patient is most appropriate for outpatient management    The patient presents with seizure and third trimester pregnancy.  She has no evidence of edema in her extremities.  UA was ordered to evaluate for protein.  Initially she is hypertensive.    Patient's urine is negative for protein, her blood pressure has improved to 110 systolic.  After speaking with Dr. Smith the patient will be discharged, he reported that the fetal monitoring was normal.  The patient will follow-up, we had further discussion about taking antiseizure medications, she will have this discussion again with her neurologist.  I discussed with the patient the signs of preeclampsia.    The patient will return for new or worsening symptoms and is stable at the time of discharge.    The patient is referred to a primary physician for blood pressure management, diabetic screening, and for all other preventative health concerns.      DISPOSITION:  Patient will be discharged home in stable condition.    FOLLOW UP:  [facility], Emergency Dept  [address] [place] [address] [phone]  Follow  up  If symptoms worsen, leg swelling, high blood pressure      OUTPATIENT MEDICATIONS:  New Prescriptions    No medications on file             FINAL DIAGNOSIS  1. Seizure (HCC)    2. 31 weeks gestation of pregnancy               Electronically signed by: Jeffery Shea M.D., 1/19/2023 1:50 PM

## 2023-03-10 ENCOUNTER — APPOINTMENT (OUTPATIENT)
Dept: NEUROLOGY | Facility: MEDICAL CENTER | Age: 23
End: 2023-03-10
Attending: STUDENT IN AN ORGANIZED HEALTH CARE EDUCATION/TRAINING PROGRAM
Payer: OTHER GOVERNMENT

## 2023-06-06 ENCOUNTER — APPOINTMENT (OUTPATIENT)
Dept: RADIOLOGY | Facility: MEDICAL CENTER | Age: 23
End: 2023-06-06
Attending: EMERGENCY MEDICINE
Payer: OTHER GOVERNMENT

## 2023-06-06 ENCOUNTER — HOSPITAL ENCOUNTER (EMERGENCY)
Facility: MEDICAL CENTER | Age: 23
End: 2023-06-06
Attending: EMERGENCY MEDICINE
Payer: OTHER GOVERNMENT

## 2023-06-06 VITALS
OXYGEN SATURATION: 98 % | BODY MASS INDEX: 29.29 KG/M2 | RESPIRATION RATE: 17 BRPM | DIASTOLIC BLOOD PRESSURE: 82 MMHG | WEIGHT: 150 LBS | SYSTOLIC BLOOD PRESSURE: 117 MMHG | TEMPERATURE: 98 F | HEART RATE: 120 BPM

## 2023-06-06 DIAGNOSIS — R56.9 SEIZURE (HCC): ICD-10-CM

## 2023-06-06 DIAGNOSIS — R51.9 ACUTE NONINTRACTABLE HEADACHE, UNSPECIFIED HEADACHE TYPE: ICD-10-CM

## 2023-06-06 LAB
ALBUMIN SERPL BCP-MCNC: 4.5 G/DL (ref 3.2–4.9)
ALBUMIN/GLOB SERPL: 2.1 G/DL
ALP SERPL-CCNC: 134 U/L (ref 30–99)
ALT SERPL-CCNC: 56 U/L (ref 2–50)
ANION GAP SERPL CALC-SCNC: 19 MMOL/L (ref 7–16)
AST SERPL-CCNC: 31 U/L (ref 12–45)
BASOPHILS # BLD AUTO: 0.3 % (ref 0–1.8)
BASOPHILS # BLD: 0.03 K/UL (ref 0–0.12)
BILIRUB SERPL-MCNC: 0.7 MG/DL (ref 0.1–1.5)
BUN SERPL-MCNC: 8 MG/DL (ref 8–22)
CALCIUM ALBUM COR SERPL-MCNC: 8.7 MG/DL (ref 8.5–10.5)
CALCIUM SERPL-MCNC: 9.1 MG/DL (ref 8.5–10.5)
CHLORIDE SERPL-SCNC: 102 MMOL/L (ref 96–112)
CO2 SERPL-SCNC: 16 MMOL/L (ref 20–33)
COHGB MFR BLD: 2.2 % (ref 0–4.9)
CREAT SERPL-MCNC: 0.72 MG/DL (ref 0.5–1.4)
EOSINOPHIL # BLD AUTO: 0.23 K/UL (ref 0–0.51)
EOSINOPHIL NFR BLD: 2.2 % (ref 0–6.9)
ERYTHROCYTE [DISTWIDTH] IN BLOOD BY AUTOMATED COUNT: 36.2 FL (ref 35.9–50)
GFR SERPLBLD CREATININE-BSD FMLA CKD-EPI: 119 ML/MIN/1.73 M 2
GLOBULIN SER CALC-MCNC: 2.1 G/DL (ref 1.9–3.5)
GLUCOSE SERPL-MCNC: 146 MG/DL (ref 65–99)
HCT VFR BLD AUTO: 45.8 % (ref 37–47)
HGB BLD-MCNC: 15.6 G/DL (ref 12–16)
IMM GRANULOCYTES # BLD AUTO: 0.02 K/UL (ref 0–0.11)
IMM GRANULOCYTES NFR BLD AUTO: 0.2 % (ref 0–0.9)
LYMPHOCYTES # BLD AUTO: 3.51 K/UL (ref 1–4.8)
LYMPHOCYTES NFR BLD: 33.1 % (ref 22–41)
MCH RBC QN AUTO: 30.2 PG (ref 27–33)
MCHC RBC AUTO-ENTMCNC: 34.1 G/DL (ref 32.2–35.5)
MCV RBC AUTO: 88.6 FL (ref 81.4–97.8)
MONOCYTES # BLD AUTO: 0.51 K/UL (ref 0–0.85)
MONOCYTES NFR BLD AUTO: 4.8 % (ref 0–13.4)
NEUTROPHILS # BLD AUTO: 6.31 K/UL (ref 1.82–7.42)
NEUTROPHILS NFR BLD: 59.4 % (ref 44–72)
NRBC # BLD AUTO: 0 K/UL
NRBC BLD-RTO: 0 /100 WBC (ref 0–0.2)
PLATELET # BLD AUTO: 276 K/UL (ref 164–446)
PMV BLD AUTO: 10.5 FL (ref 9–12.9)
POTASSIUM SERPL-SCNC: 3.5 MMOL/L (ref 3.6–5.5)
PROT SERPL-MCNC: 6.6 G/DL (ref 6–8.2)
RBC # BLD AUTO: 5.17 M/UL (ref 4.2–5.4)
SODIUM SERPL-SCNC: 137 MMOL/L (ref 135–145)
WBC # BLD AUTO: 10.6 K/UL (ref 4.8–10.8)

## 2023-06-06 PROCEDURE — 700111 HCHG RX REV CODE 636 W/ 250 OVERRIDE (IP): Performed by: EMERGENCY MEDICINE

## 2023-06-06 PROCEDURE — 36415 COLL VENOUS BLD VENIPUNCTURE: CPT

## 2023-06-06 PROCEDURE — 85025 COMPLETE CBC W/AUTO DIFF WBC: CPT

## 2023-06-06 PROCEDURE — 80053 COMPREHEN METABOLIC PANEL: CPT

## 2023-06-06 PROCEDURE — 70450 CT HEAD/BRAIN W/O DYE: CPT

## 2023-06-06 PROCEDURE — 96375 TX/PRO/DX INJ NEW DRUG ADDON: CPT

## 2023-06-06 PROCEDURE — 700105 HCHG RX REV CODE 258: Performed by: EMERGENCY MEDICINE

## 2023-06-06 PROCEDURE — 96374 THER/PROPH/DIAG INJ IV PUSH: CPT

## 2023-06-06 PROCEDURE — 82375 ASSAY CARBOXYHB QUANT: CPT

## 2023-06-06 PROCEDURE — 99284 EMERGENCY DEPT VISIT MOD MDM: CPT

## 2023-06-06 RX ORDER — LORAZEPAM 2 MG/ML
1 INJECTION INTRAMUSCULAR ONCE
Status: DISCONTINUED | OUTPATIENT
Start: 2023-06-06 | End: 2023-06-06 | Stop reason: HOSPADM

## 2023-06-06 RX ORDER — PROCHLORPERAZINE EDISYLATE 5 MG/ML
10 INJECTION INTRAMUSCULAR; INTRAVENOUS ONCE
Status: COMPLETED | OUTPATIENT
Start: 2023-06-06 | End: 2023-06-06

## 2023-06-06 RX ORDER — KETOROLAC TROMETHAMINE 30 MG/ML
30 INJECTION, SOLUTION INTRAMUSCULAR; INTRAVENOUS ONCE
Status: COMPLETED | OUTPATIENT
Start: 2023-06-06 | End: 2023-06-06

## 2023-06-06 RX ORDER — LACOSAMIDE 10 MG/ML
200 INJECTION, SOLUTION INTRAVENOUS ONCE
Status: DISCONTINUED | OUTPATIENT
Start: 2023-06-06 | End: 2023-06-06 | Stop reason: HOSPADM

## 2023-06-06 RX ORDER — SODIUM CHLORIDE 9 MG/ML
1000 INJECTION, SOLUTION INTRAVENOUS ONCE
Status: COMPLETED | OUTPATIENT
Start: 2023-06-06 | End: 2023-06-06

## 2023-06-06 RX ADMIN — SODIUM CHLORIDE 1000 ML: 9 INJECTION, SOLUTION INTRAVENOUS at 12:32

## 2023-06-06 RX ADMIN — KETOROLAC TROMETHAMINE 30 MG: 30 INJECTION, SOLUTION INTRAMUSCULAR; INTRAVENOUS at 12:41

## 2023-06-06 RX ADMIN — PROCHLORPERAZINE EDISYLATE 10 MG: 5 INJECTION INTRAMUSCULAR; INTRAVENOUS at 12:22

## 2023-06-06 ASSESSMENT — FIBROSIS 4 INDEX: FIB4 SCORE: 0.49

## 2023-06-06 NOTE — ED PROVIDER NOTES
ED Provider Note    Primary care provider: DEX Hdz    CHIEF COMPLAINT  Chief Complaint   Patient presents with    Headache       Additional history obtained from: Boyfriend and mom were at bedside.  They state that she was in her usual state of health before having a headache earlier in the day.  He also states that when she does not feel well she acts differently and is sometimes difficult in obtaining a good history.  Limitation to History:  Select: Behavior    HPI  Nasima Oh is a 23 y.o. female who presents to the Emergency Department seizure and headache.  The patient apparently was in her usual state of health when she had an abrupt onset of a fairly severe headache that occurred about 1.5 hours prior to arrival.  She states that she is nauseous, she is having severe pain.  She has had headaches with this previously but she cannot tell me the last time she had such a similar headache.  She denies any trauma.  She reports having history of epilepsy but has not been on Vimpat for several years secondary to pregnancy and breast-feeding.    She does not want to take any Ativan, initially was unwilling to take Toradol or Reglan as well as she fears that it may affect her ability to breast-feed.      External Record Review: A few prior visits for epilepsy, patient previously on Vimpat but discontinued due to pregnancies.    REVIEW OF SYSTEMS  See HPI.     PAST MEDICAL HISTORY   has a past medical history of Epilepsy (HCC), Hypothyroidism, and Other specified hypothyroidism.    SURGICAL HISTORY   has a past surgical history that includes meniscus repair (Right).    SOCIAL HISTORY  Social History     Tobacco Use    Smoking status: Never    Smokeless tobacco: Never   Vaping Use    Vaping Use: Never used   Substance Use Topics    Alcohol use: Never    Drug use: Never      Social History     Substance and Sexual Activity   Drug Use Never       FAMILY HISTORY  History reviewed. No pertinent  family history.    CURRENT MEDICATIONS  Reviewed.  See Encounter Summary.     ALLERGIES  Allergies   Allergen Reactions    Flagyl [Metronidazole]     Flu Virus Vaccine Hives    Keppra [Levetiracetam]     Lamictal [Lamotrigine]     Penicillins        PHYSICAL EXAM  VITAL SIGNS: /82   Pulse (!) 120   Temp 36.7 °C (98 °F) (Temporal)   Resp 17   Wt 68 kg (150 lb)   LMP 05/23/2023 (Approximate)   SpO2 98%   BMI 29.29 kg/m²   Constitutional: Awake, alert in no apparent distress.  Initially the patient was unresponsive, her eyes were blinking rapidly.   HENT: Normocephalic, atraumatic.  Bilateral external ears normal. Nose normal.   Eyes: Conjunctiva normal, non-icteric, EOMI. eyes initially blinking rapidly, pupils 4 mm and reactive.  Thorax & Lungs: Easy unlabored respirations, Clear to ascultation bilaterally.  Cardiovascular: Tachycardic, No murmurs, rubs or gallops. Bilateral pulses symmetrical.   Abdomen:  Soft, nontender, nondistended, normal active bowel sounds.   :    Skin: Visualized skin is  Dry, No erythema, No rash.  Multiple old linear scars on the forearms consistent with prior self cutting behavior.  Musculoskeletal:   No cyanosis, clubbing or edema. No leg asymmetry.   Neurologic: Alert, initially nonverbal, after seizure-like activity abated she had normal speech.  Psychiatric: Anxious, intermittently tearful.  Lymphatic:          RADIOLOGY  I have independently interpreted the diagnostic imaging associated with this visit and am waiting the final reading from the radiologist.   My preliminary interpretation is as follows: No acute hemorrhage    Radiologist interpretation:   CT-HEAD W/O   Final Result      1.  Head CT without contrast within normal limits. No evidence of acute cerebral infarction, hemorrhage or mass lesion.         2. Images are degraded by patient motion artifact.          COURSE & MEDICAL DECISION MAKING  Pertinent Labs & Imaging studies reviewed. (See chart for  details)    COURSE & MEDICAL DECISION MAKING  Pertinent Labs & Imaging studies reviewed. (See chart for details)    Differential diagnoses include but are not limited to: Intracranial hemorrhage, mass occupying lesion, electrolyte abnormalities, epilepsy, pseudoseizure    11:35 AM- Nursing notes reviewed, patient seen and examined at bedside.    ED Observation Status? Yes; I am placing the patient in to an observation status due to a diagnostic uncertainty as well as therapeutic intensity. Patient placed in observation status at 11:45 AM    Observation plan is as follows: Vimpat IV, Ativan IV, Compazine    Upon Reevaluation, the patient's condition has: Remained unchanged.      12:38 PM-patient not willing to stay any longer.  Initially was disoriented and unable to give me any reasonable history.  I refused to let her sign out AMA without her being more appropriate.  Just a few minutes later she was then more appropriate and cooperative, did sign out AGAINST MEDICAL ADVICE.    Patient discharged from ED Observation status at 6/6/2023 1 PM      Escalation of care considered, and ultimately not performed: after discussion with the patient / family, they have elected to decline an escalation in care.    Barriers to care at this time, including but not limited to:  Patient refuses medications .     Decision Making:  This is a pleasant 23 y.o. year old female who presents with a severe headache that was fairly abrupt in onset.  CT was obtained just a few hours after onset, therefore subarachnoid hemorrhage cannot be ruled out with nearly 99% sensitivity.  When she first presented, she had unusual seizure-like activity, mostly rapidly blinking eyes.  She did not have any urinary incontinence or tongue biting.  She also does not have a prolonged postictal phase.  Laboratory evaluation shows no leukocytosis, at this point I think it is more likely to be a pseudoseizure.  I did obtain a carbon oxide level because she had a  possible seizure and headache and carbon oxide poisoning can cause both.  Fortunately this is not elevated today.    Other abnormalities noted today were a depressed CO2 level at 16, anion gap of 19, mildly elevated glucose.  This could be stress response or starvation ketoacidosis.    I never was able to get a very succinct history from the patient.  She was initially questionably altered, then was alert but not a good historian and would just give me vague answers on questions asked.    The patient continued to have a severe headache but the neck was supple, she was neurologically intact, do not have any concerns about acute bacterial meningitis.  Abrupt onset would be very unlikely to be cavernous sinus thrombosis.    I plan to medicate the patient with Vimpat for possible seizures she refuses.  I was also going to load her with Ativan for the possible seizure, she likewise refused this.  She did take the Compazine, refused the Toradol and refused any other medications I had to offer.    At this point she was not willing to take any other medications for headache, she was not willing to stay for any further evaluation, she did express reasonable cognition when being more cooperative and therefore I allowed her to sign out AGAINST MEDICAL ADVICE.  The family did not seem overly concerned today which suggest that perhaps this is close to her baseline.   The patient was discharged home (see d/c instructions) was told to return immediately for any signs or symptoms listed, or any worsening at all.  The patient verbally agreed to the discharge precautions and follow-up plan which is documented in EPIC.    Discharge Medications:  Discharge Medication List as of 6/6/2023  1:18 PM          FINAL IMPRESSION  1. Acute nonintractable headache, unspecified headache type    2. Seizure (HCC)

## 2023-06-06 NOTE — ED NOTES
Pt now more calm and requesting to leave. Pt A&Ox4. Pt states pain medication provided some relief. Pt s/o at bedside is agreeable to take pt home. Md aware and pt needs to sign out AMA. Md has gone over risks of leaving ama. Pt verbalized understanding. Pt dressed self and ambulated out of ed w/o difficulty.

## 2023-06-06 NOTE — ED TRIAGE NOTES
Pt had presented to triage c/o HA apparently right before triage nurse went to assess her pt became non responsive. Pt was brought back to red 9 stat. Pt had stable vss but would not respond to verbal or painful stimuli for approx 1 min. Pt had no appreciable seizure activity. Pt awoke and was able to provide history of what brought her to ER GCS 15.

## 2023-06-06 NOTE — ED NOTES
Patient not responding to questions in triage. Slumped over in chair. Informed charge RN. Patient to Red 9 with Alicia fleming.

## 2023-06-06 NOTE — ED NOTES
Pt was screaming that she wants to go home and that her head was hurting too bad. Erp back to bedside to speak with pt. Pt previously refusing all medications except anti nausea medication. Pt did agree finally to take toradol